# Patient Record
Sex: MALE | Race: AMERICAN INDIAN OR ALASKA NATIVE
[De-identification: names, ages, dates, MRNs, and addresses within clinical notes are randomized per-mention and may not be internally consistent; named-entity substitution may affect disease eponyms.]

---

## 2019-06-14 ENCOUNTER — HOSPITAL ENCOUNTER (OUTPATIENT)
Dept: HOSPITAL 5 - NM | Age: 59
Discharge: HOME | End: 2019-06-14
Attending: UROLOGY
Payer: COMMERCIAL

## 2019-06-14 DIAGNOSIS — C77.5: Primary | ICD-10-CM

## 2019-06-14 DIAGNOSIS — C79.51: ICD-10-CM

## 2019-06-14 DIAGNOSIS — C61: ICD-10-CM

## 2019-06-14 PROCEDURE — 74176 CT ABD & PELVIS W/O CONTRAST: CPT

## 2019-06-14 PROCEDURE — 78306 BONE IMAGING WHOLE BODY: CPT

## 2019-06-14 PROCEDURE — A9503 TC99M MEDRONATE: HCPCS

## 2019-06-14 NOTE — CAT SCAN REPORT
CT ABDOMEN AND PELVIS WITHOUT CONTRAST: 06/14/19 10:52:00



CLINICAL:Newly diagnosed prostate cancer.



TECHNIQUE:

Volumetric acquisition and 1.25 millimeter scan reconstructions from 

the lung bases through the pelvis.  The study was performed with oral 

contrast.



FINDINGS:





Abdomen:A noncalcified 1 cm right lower lobe pleural-based lung nodule 

is identified on the uppermost slice and is partially included on the 

scan.  No other lung nodules are identified.  Normal liver, bile ducts 

and gallbladder.  Normal stomach, duodenum, pancreas and spleen.  

Normal adrenal glands and kidneys.  The renal collecting systems and 

ureters demonstrate physiologic dilatation. No ascites.  The imaged 

portions of small bowel and colon are normal.  Normal surgical 

anastomoses of the right colon and no appendix identified.



Pelvis: The prostate is massively enlarged and extends into the bladder 

floor.  It measures approximately 8.5 cm craniocaudal dimension by 9.6 

cm AP dimension by 8.5 cm transverse dimension.  The seminal vesicles 

are enlarged and bland with the enlarged prostate.  Moderate thickening 

of the urinary bladder wall.  A heterogeneously dense right internal 

iliac lymph node measures 4.7 x 3.7 cm.  A heterogeneously dense left 

internal iliac lymph node measures 4.2 x 4.1 cm.  These lymph nodes are 

located just distal to the bifurcation of the iliac artery.  Minimally 

enlarged bilateral inguinal lymph nodes.  The largest on the left 

measures 1.5 cm.



Bone windows demonstrate several highly suspicious bone lesions.  The 

largest is a mixed lytic and sclerotic lesion involving the left iliac 

bone at the posterior aspect of the SI joint.  A similar lesion of the 

right iliac bone at the same level and a similar lesion of the left 

ischium.  Suspicious mixed lytic and sclerotic lesions of L2 and T12 

vertebral bodies.





IMPRESSION: 1.  A  1 cm right lower lobe lung nodule is suspicious for 

a metastasis.  2.  Massively enlarged prostate with invasion of the 

bladder floor and seminal vesicles.  3.  Bilateral internal iliac joaquim 

metastasis with bilateral 4 cm lymph nodes.  4.  Skeletal metastases 

involving bilateral iliac bones, the left ischium and T12 and L2 

vertebra.  5.  No hydronephrosis.  6.  Status post right partial 

colectomy and appendectomy with normal anastomoses.

## 2019-06-14 NOTE — NUCLEAR MEDICINE REPORT
NUCLEAR MEDICINE WHOLE-BODY BONE SCAN: 06/14/19 11:00:00



CLINICAL: Newly diagnosed prostate cancer.



COMPARISON: A same-day CT abdomen and pelvis.



TECHNIQUE: 25 millicuries technetium 99m MDP was injected intravenously 

and scans were obtained at 3 hours.  The patient was extremely 

claustrophobic and would not tolerate whole body imaging.  Segmented 

body scans were obtained from the level of the clavicles through the 

pelvis.  The skull was not imaged.



FINDINGS: Abnormal focal uptake in the left pelvis correlates with a 

metastatic lesion identified on the CT.  Suspicious focal uptake in the 

midthoracic spine and at L2.



IMPRESSION: Skeletal metastasis involving the left pelvis, thoracic 

spine and lumbar spine.